# Patient Record
Sex: MALE | Race: WHITE | NOT HISPANIC OR LATINO | Employment: FULL TIME | URBAN - NONMETROPOLITAN AREA
[De-identification: names, ages, dates, MRNs, and addresses within clinical notes are randomized per-mention and may not be internally consistent; named-entity substitution may affect disease eponyms.]

---

## 2017-06-23 ENCOUNTER — HOSPITAL ENCOUNTER (OUTPATIENT)
Dept: PHYSICAL THERAPY | Facility: OTHER | Age: 47
End: 2017-06-23
Attending: FAMILY MEDICINE

## 2018-02-22 ENCOUNTER — DOCUMENTATION ONLY (OUTPATIENT)
Dept: FAMILY MEDICINE | Facility: OTHER | Age: 48
End: 2018-02-22

## 2018-03-05 ENCOUNTER — OFFICE VISIT (OUTPATIENT)
Dept: FAMILY MEDICINE | Facility: OTHER | Age: 48
End: 2018-03-05
Attending: FAMILY MEDICINE
Payer: COMMERCIAL

## 2018-03-05 VITALS
HEART RATE: 68 BPM | DIASTOLIC BLOOD PRESSURE: 80 MMHG | HEIGHT: 71 IN | WEIGHT: 238.6 LBS | SYSTOLIC BLOOD PRESSURE: 124 MMHG | BODY MASS INDEX: 33.4 KG/M2

## 2018-03-05 DIAGNOSIS — L57.0 ACTINIC KERATOSIS: Primary | ICD-10-CM

## 2018-03-05 PROCEDURE — 99212 OFFICE O/P EST SF 10 MIN: CPT | Performed by: FAMILY MEDICINE

## 2018-03-05 ASSESSMENT — ANXIETY QUESTIONNAIRES
3. WORRYING TOO MUCH ABOUT DIFFERENT THINGS: NOT AT ALL
5. BEING SO RESTLESS THAT IT IS HARD TO SIT STILL: SEVERAL DAYS
GAD7 TOTAL SCORE: 3
IF YOU CHECKED OFF ANY PROBLEMS ON THIS QUESTIONNAIRE, HOW DIFFICULT HAVE THESE PROBLEMS MADE IT FOR YOU TO DO YOUR WORK, TAKE CARE OF THINGS AT HOME, OR GET ALONG WITH OTHER PEOPLE: SOMEWHAT DIFFICULT
6. BECOMING EASILY ANNOYED OR IRRITABLE: NOT AT ALL
1. FEELING NERVOUS, ANXIOUS, OR ON EDGE: SEVERAL DAYS
2. NOT BEING ABLE TO STOP OR CONTROL WORRYING: NOT AT ALL
7. FEELING AFRAID AS IF SOMETHING AWFUL MIGHT HAPPEN: NOT AT ALL

## 2018-03-05 ASSESSMENT — PATIENT HEALTH QUESTIONNAIRE - PHQ9: 5. POOR APPETITE OR OVEREATING: SEVERAL DAYS

## 2018-03-05 NOTE — LETTER
[facility]  [address]  [place] [address]  Phone: [phone]  Fax: [phone]    March 5, 2018        Bubba Boyle     [facility] [address]        Dear Bubba,    I realized after you had left that I failed to freeze your ear lesion.  I apologize for forgetting.  During her next visit here we can certainly freeze it.  If you would like to try Efudex for it please let me know and I will send in a prescription.    Sincerely,        Cecil Villanueva MD

## 2018-03-05 NOTE — MR AVS SNAPSHOT
"              After Visit Summary   3/5/2018    Bubba Boyle    MRN: [id number]           Patient Information     Date Of Birth          1970        Visit Information        Provider Department      3/5/2018 7:45 AM Cecil Villanueva MD [facility]        Today's Diagnoses     Actinic keratosis    -  1       Follow-ups after your visit        Who to contact     If you have questions or need follow up information about today's clinic visit or your schedule please contact [facility] directly at [phone].  Normal or non-critical lab and imaging results will be communicated to you by RightSignaturehart, letter or phone within 4 business days after the clinic has received the results. If you do not hear from us within 7 days, please contact the clinic through SinColat or phone. If you have a critical or abnormal lab result, we will notify you by phone as soon as possible.  Submit refill requests through Rethink or call your pharmacy and they will forward the refill request to us. Please allow 3 business days for your refill to be completed.          Additional Information About Your Visit        MyChart Information     Rethink lets you send messages to your doctor, view your test results, renew your prescriptions, schedule appointments and more. To sign up, go to [email or web address] . Click on \"Log in\" on the left side of the screen, which will take you to the Welcome page. Then click on \"Sign up Now\" on the right side of the page.     You will be asked to enter the access code listed below, as well as some personal information. Please follow the directions to create your username and password.     Your access code is: [email or web address]  Expires: 6/3/2018  9:05 AM     Your access code will  in 90 days. If you need help or a new code, please call your [facility] or [phone].        Care EveryWhere ID     This is your Care EveryWhere ID. This could be used by other " "organizations to access your [facility] medical records  [id number]        Your Vitals Were     Pulse Height BMI (Body Mass Index)             68 5' 10.5\" (1.791 m) 33.75 kg/m2          Blood Pressure from Last 3 Encounters:   03/05/18 124/80    Weight from Last 3 Encounters:   03/05/18 238 lb 9.6 oz (108.2 kg)              We Performed the Following     DESTRUCT PREMALIGNANT LESION, FIRST        Primary Care Provider Office Phone # Fax #    [facility] [phone] [address] [facility] [address]        Equal Access to Services     [facility] campusJOSE : Hadii pepe may hadgastono Sosamantha, waaxda luqadaha, qaybta kaalmada adeloniyabrock, greg stuart . So [facility] [phone].    ATENCIÓN: Si habla español, tiene a ghosh disposición servicios gratuitos de asistencia lingüística. Llame al [phone].    We comply with applicable federal civil rights laws and [place] laws. We do not discriminate on the basis of race, color, national origin, age, disability, sex, sexual orientation, or gender identity.            Thank you!     Thank you for choosing [facility] [place]  for your care. Our goal is always to provide you with excellent care. Hearing back from our patients is one way we can continue to improve our services. Please take a few minutes to complete the written survey that you may receive in the mail after your visit with us. Thank you!             Your Updated Medication List - Protect others around you: Learn how to safely use, store and throw away your medicines at www.disposemymeds.org.          This list is accurate as of 3/5/18  9:05 AM.  Always use your most recent med list.                   Brand Name Dispense Instructions for use Diagnosis    aspirin 81 MG tablet      Take by mouth daily        ranitidine 150 MG tablet    ZANTAC     Take 150 mg by mouth daily          "

## 2018-03-05 NOTE — PROGRESS NOTES
"  SUBJECTIVE:   Bubba Boyle is a 47 year old male who presents to clinic today for the following health issues: Establish care skin issues    HPI Comments: Patient arrives here to establish care and skin issues.  He has a couple lesions on his face and nose.  He states it flakes off.  And then seems to almost healed well.        Patient Active Problem List    Diagnosis Date Noted     Actinic keratosis 03/05/2018     Priority: Medium     No past medical history on file.   Social History     Social History Narrative     No narrative on file     Current Outpatient Prescriptions   Medication Sig Dispense Refill     ranitidine (ZANTAC) 150 MG tablet Take 150 mg by mouth daily       aspirin 81 MG tablet Take by mouth daily       No Known Allergies    Review of Systems     OBJECTIVE:     /80  Pulse 68  Ht 5' 10.5\" (1.791 m)  Wt 238 lb 9.6 oz (108.2 kg)  BMI 33.75 kg/m2  Body mass index is 33.75 kg/(m^2).  Physical Exam   Constitutional: He appears well-developed.   HENT:   Head: Normocephalic.   Skin:   Scaling lesions on his nose one on his left ear and just anterior to his ears on his cheek not appear to be cancerous.       none     ASSESSMENT/PLAN:         1. Actinic keratosis  Forgot to cryo-his skin lesion.  Letter will be dictated.  - DESTRUCT PREMALIGNANT LESION, FIRST      Cecil Villanueva MD  [facility]  "

## 2018-03-05 NOTE — NURSING NOTE
Patient here to establish care, concerns with skin cancer. Rozina Schumacher LPN .......................3/5/2018  8:09 AM

## 2018-03-06 ASSESSMENT — PATIENT HEALTH QUESTIONNAIRE - PHQ9: SUM OF ALL RESPONSES TO PHQ QUESTIONS 1-9: 0

## 2018-03-06 ASSESSMENT — ANXIETY QUESTIONNAIRES: GAD7 TOTAL SCORE: 3

## 2021-04-19 ENCOUNTER — TELEPHONE (OUTPATIENT)
Dept: FAMILY MEDICINE | Facility: OTHER | Age: 51
End: 2021-04-19

## 2021-04-19 ENCOUNTER — OFFICE VISIT (OUTPATIENT)
Dept: FAMILY MEDICINE | Facility: OTHER | Age: 51
End: 2021-04-19
Attending: PHYSICIAN ASSISTANT
Payer: COMMERCIAL

## 2021-04-19 VITALS
WEIGHT: 251 LBS | HEIGHT: 73 IN | DIASTOLIC BLOOD PRESSURE: 80 MMHG | OXYGEN SATURATION: 98 % | BODY MASS INDEX: 33.27 KG/M2 | HEART RATE: 84 BPM | TEMPERATURE: 96.7 F | RESPIRATION RATE: 16 BRPM | SYSTOLIC BLOOD PRESSURE: 118 MMHG

## 2021-04-19 DIAGNOSIS — A69.20 LYME DISEASE: Primary | ICD-10-CM

## 2021-04-19 PROCEDURE — 99202 OFFICE O/P NEW SF 15 MIN: CPT | Performed by: PHYSICIAN ASSISTANT

## 2021-04-19 RX ORDER — DOXYCYCLINE HYCLATE 100 MG
100 TABLET ORAL 2 TIMES DAILY
Qty: 2 TABLET | Refills: 0 | Status: SHIPPED | OUTPATIENT
Start: 2021-04-19

## 2021-04-19 ASSESSMENT — ANXIETY QUESTIONNAIRES
5. BEING SO RESTLESS THAT IT IS HARD TO SIT STILL: SEVERAL DAYS
6. BECOMING EASILY ANNOYED OR IRRITABLE: SEVERAL DAYS
1. FEELING NERVOUS, ANXIOUS, OR ON EDGE: SEVERAL DAYS
7. FEELING AFRAID AS IF SOMETHING AWFUL MIGHT HAPPEN: SEVERAL DAYS
3. WORRYING TOO MUCH ABOUT DIFFERENT THINGS: SEVERAL DAYS
IF YOU CHECKED OFF ANY PROBLEMS ON THIS QUESTIONNAIRE, HOW DIFFICULT HAVE THESE PROBLEMS MADE IT FOR YOU TO DO YOUR WORK, TAKE CARE OF THINGS AT HOME, OR GET ALONG WITH OTHER PEOPLE: SOMEWHAT DIFFICULT
2. NOT BEING ABLE TO STOP OR CONTROL WORRYING: SEVERAL DAYS
GAD7 TOTAL SCORE: 7

## 2021-04-19 ASSESSMENT — PATIENT HEALTH QUESTIONNAIRE - PHQ9: 5. POOR APPETITE OR OVEREATING: SEVERAL DAYS

## 2021-04-19 ASSESSMENT — PAIN SCALES - GENERAL: PAINLEVEL: NO PAIN (1)

## 2021-04-19 ASSESSMENT — MIFFLIN-ST. JEOR: SCORE: 2042.41

## 2021-04-19 NOTE — PROGRESS NOTES
"  SUBJECTIVE:   HPI  Bubba Boyle is a 51 year old male here for a tick bite.    Patient pulled a deer tick off his left upper chest yesterday.  There is redness and slight swelling around the area.  He took a picture and this is identified as the deer tick.  He denies any fever, chills, night sweats, myalgias, GI upset, or other rashes.    Allergies:  Not on File    ROS:  As above otherwise unremarkable.     OBJECTIVE:   /80 (BP Location: Left arm, Patient Position: Sitting, Cuff Size: Adult Large)   Pulse 84   Temp 96.7  F (35.9  C) (Temporal)   Resp 16   Ht 1.854 m (6' 1\")   Wt 113.9 kg (251 lb)   SpO2 98%   BMI 33.12 kg/m    Physical Exam  General Appearance: Pleasant, alert, appropriate appearance for age and circumstances, no acute distress  Skin: Localized erythema and swelling around tick bite in the shape of a target pattern.      ASSESSMENT/PLAN:     1. Lyme disease        Single dose of doxycycline 200 mg as the tick was pulled off just yesterday.    He will follow-up as needed.    No orders of the defined types were placed in this encounter.    Total time spent with this patient was 10 minutes which included chart review, visualization and interpretation of images, time spent with the patient, and documentation.    EDMAR Stovall  April 19, 2021  9:27 AM  "

## 2021-04-19 NOTE — TELEPHONE ENCOUNTER
Per clinic policy and tick protocol, Pt does eligible for triage, due to not being seen by primary care provider in >3 years. Writer suggested Pt make appointment to be seen in clinic and he was transferred to scheduling line, to set up appointment:    Next 5 appointments (look out 90 days)    Apr 19, 2021  9:00 AM  SHORT with EDMAR Aguillon  [facility] ([facility] ) [address]  [place] [address]  [phone]        Luz Schulte RN .............. 4/19/2021  8:24 AM

## 2021-04-19 NOTE — NURSING NOTE
"Chief Complaint   Patient presents with     Insect Bites     left side of chest   Patient presented with tick bit on left side of chest.  Linda Moncada LPN on 4/19/2021 at 9:21 AM        Initial /80 (BP Location: Left arm, Patient Position: Sitting, Cuff Size: Adult Large)   Pulse 84   Temp 96.7  F (35.9  C) (Temporal)   Resp 16   Ht 1.854 m (6' 1\")   Wt 113.9 kg (251 lb)   SpO2 98%   BMI 33.12 kg/m   Estimated body mass index is 33.12 kg/m  as calculated from the following:    Height as of this encounter: 1.854 m (6' 1\").    Weight as of this encounter: 113.9 kg (251 lb).  Medication Reconciliation: complete    Linda Moncada LPN  "

## 2021-04-19 NOTE — TELEPHONE ENCOUNTER
Patient had a tick embedded.  He would like the pill.  He retained the tick    Please call    Thank you

## 2021-04-20 ASSESSMENT — ANXIETY QUESTIONNAIRES: GAD7 TOTAL SCORE: 7
